# Patient Record
Sex: MALE | Race: OTHER | HISPANIC OR LATINO
[De-identification: names, ages, dates, MRNs, and addresses within clinical notes are randomized per-mention and may not be internally consistent; named-entity substitution may affect disease eponyms.]

---

## 2024-02-01 PROBLEM — Z00.00 ENCOUNTER FOR PREVENTIVE HEALTH EXAMINATION: Status: ACTIVE | Noted: 2024-02-01

## 2024-02-07 ENCOUNTER — APPOINTMENT (OUTPATIENT)
Dept: SURGERY | Facility: CLINIC | Age: 74
End: 2024-02-07
Payer: COMMERCIAL

## 2024-02-07 ENCOUNTER — NON-APPOINTMENT (OUTPATIENT)
Age: 74
End: 2024-02-07

## 2024-02-07 VITALS
WEIGHT: 220 LBS | OXYGEN SATURATION: 97 % | BODY MASS INDEX: 29.8 KG/M2 | TEMPERATURE: 98.5 F | DIASTOLIC BLOOD PRESSURE: 88 MMHG | HEIGHT: 72 IN | SYSTOLIC BLOOD PRESSURE: 134 MMHG | HEART RATE: 66 BPM

## 2024-02-07 DIAGNOSIS — Z78.9 OTHER SPECIFIED HEALTH STATUS: ICD-10-CM

## 2024-02-07 DIAGNOSIS — E11.9 TYPE 2 DIABETES MELLITUS W/OUT COMPLICATIONS: ICD-10-CM

## 2024-02-07 DIAGNOSIS — F32.A DEPRESSION, UNSPECIFIED: ICD-10-CM

## 2024-02-07 DIAGNOSIS — E78.5 HYPERLIPIDEMIA, UNSPECIFIED: ICD-10-CM

## 2024-02-07 PROCEDURE — 99213 OFFICE O/P EST LOW 20 MIN: CPT

## 2024-02-07 NOTE — PLAN
[FreeTextEntry1] : This is a 73-year-old male who is a poor historian who states that several years ago he had a lump on the back of his neck that was diagnosed by ultrasound as a lipoma.  He states that it is continued to grow and now he does not like it and it causes him discomfort when he turns his head.  Limited review of systems: He denies fevers, denies night sweats, denies weight loss.  He has allergies to sulfa  He denies being on any blood thinning medications.  On limited exam he has a large soft tissue mass on his posterior neck crossing the midline but more so on the left side.  There is no overlying skin changes and it has the consistency of a lipoma.  The edges are smooth and well-defined and is not appear to be fixed to the underlying fascia.  It is approximately 14 cm and size by with an approximately 5 cm by height.  Plan: Patient has a large soft tissue mass on his posterior neck that is causing him discomfort.  It is at a size where it theoretically could have a malignant potential.  Recommend that it be excised in the operating room for these reasons.  Risk of bleeding, infection, numbness, scar, have been explained to the patient and he is agreeable to the procedure.  He also understands that this is malignant he may need further excision done by a sarcoma specialist.  Consent was taken with Italian translation services and recorded in the consent form.  The patient will need Ancef prophylaxis.  Procedure can likely be done in the lateral recumbent position with the left side up.  He will need preop clearance by his primary care provider for light sedation in the operating room.

## 2024-03-03 ENCOUNTER — RESULT REVIEW (OUTPATIENT)
Age: 74
End: 2024-03-03

## 2024-03-04 ENCOUNTER — TRANSCRIPTION ENCOUNTER (OUTPATIENT)
Age: 74
End: 2024-03-04

## 2024-03-04 ENCOUNTER — APPOINTMENT (OUTPATIENT)
Dept: SURGERY | Facility: HOSPITAL | Age: 74
End: 2024-03-04

## 2024-03-05 DIAGNOSIS — I10 ESSENTIAL (PRIMARY) HYPERTENSION: ICD-10-CM

## 2024-03-05 DIAGNOSIS — N42.9 DISORDER OF PROSTATE, UNSPECIFIED: ICD-10-CM

## 2024-03-05 RX ORDER — LISINOPRIL 40 MG/1
40 TABLET ORAL
Qty: 90 | Refills: 0 | Status: ACTIVE | COMMUNITY
Start: 2023-09-13

## 2024-03-05 RX ORDER — METFORMIN HYDROCHLORIDE 500 MG/1
500 TABLET, COATED ORAL
Refills: 0 | Status: ACTIVE | COMMUNITY
Start: 2023-09-13

## 2024-03-05 RX ORDER — TAMSULOSIN HYDROCHLORIDE 0.4 MG/1
0.4 CAPSULE ORAL
Refills: 0 | Status: ACTIVE | COMMUNITY
Start: 2023-06-22

## 2024-03-05 RX ORDER — DICLOFENAC SODIUM 1% 10 MG/G
1 GEL TOPICAL
Qty: 100 | Refills: 0 | Status: ACTIVE | COMMUNITY
Start: 2023-09-13

## 2024-03-05 RX ORDER — ATORVASTATIN CALCIUM 40 MG/1
40 TABLET, FILM COATED ORAL
Refills: 0 | Status: ACTIVE | COMMUNITY
Start: 2024-01-03

## 2024-03-05 RX ORDER — HYDROCHLOROTHIAZIDE 25 MG/1
25 TABLET ORAL
Qty: 90 | Refills: 0 | Status: ACTIVE | COMMUNITY
Start: 2023-12-20

## 2024-03-06 ENCOUNTER — APPOINTMENT (OUTPATIENT)
Dept: SURGERY | Facility: CLINIC | Age: 74
End: 2024-03-06
Payer: COMMERCIAL

## 2024-03-06 VITALS
DIASTOLIC BLOOD PRESSURE: 87 MMHG | OXYGEN SATURATION: 96 % | HEART RATE: 64 BPM | SYSTOLIC BLOOD PRESSURE: 138 MMHG | TEMPERATURE: 98 F

## 2024-03-06 PROCEDURE — 99024 POSTOP FOLLOW-UP VISIT: CPT

## 2024-03-06 NOTE — PLAN
[FreeTextEntry1] : Patient is 2 days status post excision of a soft tissue mass from his posterior neck.  It was quite large mass with a good amount of dead space and therefore a Penrose drain was left.  He is here to be evaluated for Penrose drain removal.  His dressing is removed as well as his Penrose drain and there is approximately 20 mL of serous fluid drains with the Penrose.  There is no blood clot.  There is no sign of infection.  Dry dressing is placed.  Plan: Patient can shower.  He will apply a dry dressing as appropriate until the opening stops draining fluid.  I expect that take about 3 days.  He will follow me in 1 week's time to see that that has sealed and to review his pathology.  I suspect it will be a large benign lipoma.

## 2024-03-13 ENCOUNTER — APPOINTMENT (OUTPATIENT)
Dept: SURGERY | Facility: CLINIC | Age: 74
End: 2024-03-13
Payer: COMMERCIAL

## 2024-03-13 VITALS
OXYGEN SATURATION: 96 % | DIASTOLIC BLOOD PRESSURE: 82 MMHG | TEMPERATURE: 97.6 F | HEART RATE: 74 BPM | SYSTOLIC BLOOD PRESSURE: 116 MMHG

## 2024-03-13 DIAGNOSIS — D17.0 BENIGN LIPOMATOUS NEOPLASM OF SKIN AND SUBCUTANEOUS TISSUE OF HEAD, FACE AND NECK: ICD-10-CM

## 2024-03-13 PROCEDURE — 99024 POSTOP FOLLOW-UP VISIT: CPT

## 2024-03-13 NOTE — PLAN
[FreeTextEntry1] : Patient is 2 weeks status post excision of a large soft tissue mass from his posterior neck.  He had his Penrose drain removed at the last visit.  Pathology confirms that this is a benign lipoma.  The patient has no complaints.  He is very happy with his cosmetic outcome.  On exam the incision is well-healed.  The Dermabond is still intact.  There is no significant seroma.  There is no sign of infection.  There is no reported numbness to palpation.  Plan: Patient will follow me on as-needed basis.  He will avoid sunlight in that area for 1 year to help with scar cosmesis.

## 2024-08-12 ENCOUNTER — RESULT REVIEW (OUTPATIENT)
Age: 74
End: 2024-08-12

## 2024-08-12 ENCOUNTER — APPOINTMENT (OUTPATIENT)
Dept: HEMATOLOGY ONCOLOGY | Facility: CLINIC | Age: 74
End: 2024-08-12
Payer: COMMERCIAL

## 2024-08-12 VITALS
OXYGEN SATURATION: 96 % | WEIGHT: 244.25 LBS | HEART RATE: 81 BPM | DIASTOLIC BLOOD PRESSURE: 85 MMHG | TEMPERATURE: 97.6 F | BODY MASS INDEX: 33.08 KG/M2 | RESPIRATION RATE: 16 BRPM | SYSTOLIC BLOOD PRESSURE: 136 MMHG | HEIGHT: 72 IN

## 2024-08-12 DIAGNOSIS — Z86.39 PERSONAL HISTORY OF OTHER ENDOCRINE, NUTRITIONAL AND METABOLIC DISEASE: ICD-10-CM

## 2024-08-12 DIAGNOSIS — R42 DIZZINESS AND GIDDINESS: ICD-10-CM

## 2024-08-12 DIAGNOSIS — R79.89 OTHER SPECIFIED ABNORMAL FINDINGS OF BLOOD CHEMISTRY: ICD-10-CM

## 2024-08-12 DIAGNOSIS — I10 ESSENTIAL (PRIMARY) HYPERTENSION: ICD-10-CM

## 2024-08-12 PROCEDURE — 99204 OFFICE O/P NEW MOD 45 MIN: CPT

## 2024-08-14 NOTE — HISTORY OF PRESENT ILLNESS
[de-identified] :  74 year M  referred for elevated ferritin. Patient is of  ethnicity First found to have elevated ferritin/iron saturation on May 2024 per patient. Has not had hemochromatosis mutation testing. Denies history of heart disease, cirrhosis, hypogonadism, hyperpigmentation.  Father has history of heart disease at age 84.  Pt had a blood test, and his ferritin according to patient is elevated. Pt states his abdomen gets swollen and is swollen. Pt taking omeprazole. Pt states he has a hiatal hernia. Pt states he has constant acid reflux. Pt states he has constant sweating only in his head region. Pt had labs on 7/15/24 and has a PMHX of HTN, HLD.  May 2024Ferritin lvl: 467, his ESR: 59. Pt had surgery in his neck (posterior) and had a tumor excised and shown to be negative.   Has dizziness, a few times the last month.Denies sleep apnea.  Has hip prothesis

## 2024-08-14 NOTE — REVIEW OF SYSTEMS
[Abdominal Pain] : abdominal pain [Vomiting] : vomiting [Constipation] : constipation [Joint Pain] : joint pain [Joint Stiffness] : joint stiffness [Dizziness] : dizziness [Anxiety] : anxiety [Negative] : Heme/Lymph [Muscle Pain] : no muscle pain [Muscle Weakness] : no muscle weakness [FreeTextEntry3] : Glasses [FreeTextEntry5] : HTN and HLD  [FreeTextEntry7] : Hiatal hernia, pt had vomiting two weeks.  [FreeTextEntry8] : Pt has erectile dysfunction [de-identified] : had fall 2/3 months ago. [de-identified] : has poor sleep

## 2024-08-14 NOTE — REVIEW OF SYSTEMS
[Abdominal Pain] : abdominal pain [Vomiting] : vomiting [Constipation] : constipation [Joint Pain] : joint pain [Joint Stiffness] : joint stiffness [Dizziness] : dizziness [Anxiety] : anxiety [Negative] : Heme/Lymph [Muscle Pain] : no muscle pain [Muscle Weakness] : no muscle weakness [FreeTextEntry3] : Glasses [FreeTextEntry5] : HTN and HLD  [FreeTextEntry7] : Hiatal hernia, pt had vomiting two weeks.  [FreeTextEntry8] : Pt has erectile dysfunction [de-identified] : had fall 2/3 months ago. [de-identified] : has poor sleep

## 2024-08-14 NOTE — REVIEW OF SYSTEMS
[Abdominal Pain] : abdominal pain [Vomiting] : vomiting [Constipation] : constipation [Joint Pain] : joint pain [Joint Stiffness] : joint stiffness [Dizziness] : dizziness [Anxiety] : anxiety [Negative] : Heme/Lymph [Muscle Pain] : no muscle pain [Muscle Weakness] : no muscle weakness [FreeTextEntry3] : Glasses [FreeTextEntry5] : HTN and HLD  [FreeTextEntry7] : Hiatal hernia, pt had vomiting two weeks.  [FreeTextEntry8] : Pt has erectile dysfunction [de-identified] : had fall 2/3 months ago. [de-identified] : has poor sleep

## 2024-08-14 NOTE — HISTORY OF PRESENT ILLNESS
[de-identified] :  74 year M  referred for elevated ferritin. Patient is of  ethnicity First found to have elevated ferritin/iron saturation on May 2024 per patient. Has not had hemochromatosis mutation testing. Denies history of heart disease, cirrhosis, hypogonadism, hyperpigmentation.  Father has history of heart disease at age 84.  Pt had a blood test, and his ferritin according to patient is elevated. Pt states his abdomen gets swollen and is swollen. Pt taking omeprazole. Pt states he has a hiatal hernia. Pt states he has constant acid reflux. Pt states he has constant sweating only in his head region. Pt had labs on 7/15/24 and has a PMHX of HTN, HLD.  May 2024Ferritin lvl: 467, his ESR: 59. Pt had surgery in his neck (posterior) and had a tumor excised and shown to be negative.   Has dizziness, a few times the last month.Denies sleep apnea.  Has hip prothesis

## 2024-08-14 NOTE — ASSESSMENT
[FreeTextEntry1] : #Elevated ferritin -discussed differential diagnosis -elevated ferritin can occur in the setting of iron overload syndromes such as hemochromatosis or thalassemia -will check hemochromatosis -normal CBC, does not have signs of thalassemia -if patient has hemochromatosis, treatment would be phlebotomy -patient doesn't drinks alcohol socially -had elevated ferritin and elevated ESR -has have elevated cholesterol and prediabetes -has  elevated BMI -may have elevated ferritin from metabolic syndrome as well -has not MRI without liver iron protocol that was unremarkable  #Hip prosthesis, 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

## 2024-08-14 NOTE — HISTORY OF PRESENT ILLNESS
[de-identified] :  74 year M  referred for elevated ferritin. Patient is of  ethnicity First found to have elevated ferritin/iron saturation on May 2024 per patient. Has not had hemochromatosis mutation testing. Denies history of heart disease, cirrhosis, hypogonadism, hyperpigmentation.  Father has history of heart disease at age 84.  Pt had a blood test, and his ferritin according to patient is elevated. Pt states his abdomen gets swollen and is swollen. Pt taking omeprazole. Pt states he has a hiatal hernia. Pt states he has constant acid reflux. Pt states he has constant sweating only in his head region. Pt had labs on 7/15/24 and has a PMHX of HTN, HLD.  May 2024Ferritin lvl: 467, his ESR: 59. Pt had surgery in his neck (posterior) and had a tumor excised and shown to be negative.   Has dizziness, a few times the last month.Denies sleep apnea.  Has hip prothesis

## 2024-08-26 ENCOUNTER — RESULT REVIEW (OUTPATIENT)
Age: 74
End: 2024-08-26

## 2024-08-26 ENCOUNTER — APPOINTMENT (OUTPATIENT)
Dept: HEMATOLOGY ONCOLOGY | Facility: CLINIC | Age: 74
End: 2024-08-26
Payer: COMMERCIAL

## 2024-08-26 VITALS
WEIGHT: 244.31 LBS | HEART RATE: 73 BPM | OXYGEN SATURATION: 97 % | RESPIRATION RATE: 16 BRPM | SYSTOLIC BLOOD PRESSURE: 131 MMHG | TEMPERATURE: 97.2 F | HEIGHT: 72 IN | DIASTOLIC BLOOD PRESSURE: 80 MMHG | BODY MASS INDEX: 33.09 KG/M2

## 2024-08-26 DIAGNOSIS — R14.0 ABDOMINAL DISTENSION (GASEOUS): ICD-10-CM

## 2024-08-26 DIAGNOSIS — R61 GENERALIZED HYPERHIDROSIS: ICD-10-CM

## 2024-08-26 DIAGNOSIS — R53.83 OTHER FATIGUE: ICD-10-CM

## 2024-08-26 PROCEDURE — 99214 OFFICE O/P EST MOD 30 MIN: CPT

## 2024-08-26 NOTE — HISTORY OF PRESENT ILLNESS
[de-identified] :  74 year M  referred for elevated ferritin. Patient is of  ethnicity First found to have elevated ferritin/iron saturation on May 2024 per patient. Has not had hemochromatosis mutation testing. Denies history of heart disease, cirrhosis, hypogonadism, hyperpigmentation.  Father has history of heart disease at age 84.  Pt had a blood test, and his ferritin according to patient is elevated. Pt states his abdomen gets swollen and is swollen. Pt taking omeprazole. Pt states he has a hiatal hernia. Pt states he has constant acid reflux. Pt states he has constant sweating only in his head region. Pt had labs on 7/15/24 and has a PMHX of HTN, HLD.  May 2024Ferritin lvl: 467, his ESR: 59. Pt had surgery in his neck (posterior) and had a tumor excised and shown to be negative.   Has dizziness, a few times the last month.Denies sleep apnea.  Has hip prothesis [de-identified] : 8/26/2024 patient here for follow-up.  aileen#[id number], vladimir#[id number]. c/o fatigue, swelling in the stomach from hernia. patient with sweating, denies weight loss or fevers Has fatigue today

## 2024-08-26 NOTE — REVIEW OF SYSTEMS
[Abdominal Pain] : abdominal pain [Vomiting] : vomiting [Constipation] : constipation [Joint Pain] : joint pain [Joint Stiffness] : joint stiffness [Dizziness] : dizziness [Anxiety] : anxiety [Negative] : Heme/Lymph [Muscle Pain] : no muscle pain [Muscle Weakness] : no muscle weakness [FreeTextEntry3] : Glasses [FreeTextEntry5] : HTN and HLD  [FreeTextEntry7] : Hiatal hernia, pt had vomiting two weeks.  [FreeTextEntry8] : Pt has erectile dysfunction [de-identified] : had fall 2/3 months ago. [de-identified] : has poor sleep

## 2024-08-26 NOTE — ASSESSMENT
[FreeTextEntry1] : #Elevated ferritin  likely from metabolic syndrome and hemochromatosis carrier status -patient is a carrier for hemochromatosis H63D -normal CBC, does not have signs of thalassemia -does not need phlebotomy at this time -patient doesn't drinks alcohol socially -had elevated ferritin and elevated ESR -has have elevated cholesterol and prediabetes -has  elevated BMI -may have elevated ferritin from metabolic syndrome as well -has not had MRI without liver iron protocol that was unremarkable   #Bloating -will refer to gastroenterology  #Fatigue -does not feel well  #MGUS IgG lambda low risk for progression d/w patient evolution of MGUS. 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

## 2024-08-26 NOTE — REVIEW OF SYSTEMS
[Abdominal Pain] : abdominal pain [Vomiting] : vomiting [Constipation] : constipation [Joint Pain] : joint pain [Joint Stiffness] : joint stiffness [Dizziness] : dizziness [Anxiety] : anxiety [Negative] : Heme/Lymph [Muscle Pain] : no muscle pain [Muscle Weakness] : no muscle weakness [FreeTextEntry3] : Glasses [FreeTextEntry5] : HTN and HLD  [FreeTextEntry7] : Hiatal hernia, pt had vomiting two weeks.  [FreeTextEntry8] : Pt has erectile dysfunction [de-identified] : had fall 2/3 months ago. [de-identified] : has poor sleep

## 2024-09-13 ENCOUNTER — NON-APPOINTMENT (OUTPATIENT)
Age: 74
End: 2024-09-13

## 2024-10-01 DIAGNOSIS — Z00.00 ENCOUNTER FOR GENERAL ADULT MEDICAL EXAMINATION W/OUT ABNORMAL FINDINGS: ICD-10-CM

## 2024-10-01 NOTE — REVIEW OF SYSTEMS
[Abdominal Pain] : abdominal pain [Vomiting] : vomiting [Constipation] : constipation [Joint Pain] : joint pain [Joint Stiffness] : joint stiffness [Muscle Pain] : no muscle pain [Muscle Weakness] : no muscle weakness [Dizziness] : dizziness [Anxiety] : anxiety [Negative] : Heme/Lymph [FreeTextEntry3] : Glasses [FreeTextEntry5] : HTN and HLD  [FreeTextEntry7] : Hiatal hernia, pt had vomiting two weeks.  [FreeTextEntry8] : Pt has erectile dysfunction [de-identified] : had fall 2/3 months ago. [de-identified] : has poor sleep

## 2024-10-01 NOTE — HISTORY OF PRESENT ILLNESS
[de-identified] :  74 year M  referred for elevated ferritin. Patient is of  ethnicity First found to have elevated ferritin/iron saturation on May 2024 per patient. Has not had hemochromatosis mutation testing. Denies history of heart disease, cirrhosis, hypogonadism, hyperpigmentation.  Father has history of heart disease at age 84.  Pt had a blood test, and his ferritin according to patient is elevated. Pt states his abdomen gets swollen and is swollen. Pt taking omeprazole. Pt states he has a hiatal hernia. Pt states he has constant acid reflux. Pt states he has constant sweating only in his head region. Pt had labs on 7/15/24 and has a PMHX of HTN, HLD.  May 2024Ferritin lvl: 467, his ESR: 59. Pt had surgery in his neck (posterior) and had a tumor excised and shown to be negative.   Has dizziness, a few times the last month.Denies sleep apnea.  Has hip prothesis [de-identified] : 8/26/2024 patient here for follow-up.  aileen#[id number], vladimir#[id number]. c/o fatigue, swelling in the stomach from hernia. patient with sweating, denies weight loss or fevers Has fatigue today

## 2024-10-02 ENCOUNTER — APPOINTMENT (OUTPATIENT)
Dept: HEMATOLOGY ONCOLOGY | Facility: CLINIC | Age: 74
End: 2024-10-02